# Patient Record
Sex: FEMALE
[De-identification: names, ages, dates, MRNs, and addresses within clinical notes are randomized per-mention and may not be internally consistent; named-entity substitution may affect disease eponyms.]

---

## 2023-09-07 PROBLEM — Z00.00 ENCOUNTER FOR PREVENTIVE HEALTH EXAMINATION: Status: ACTIVE | Noted: 2023-09-07

## 2023-09-09 ENCOUNTER — APPOINTMENT (OUTPATIENT)
Dept: ORTHOPEDIC SURGERY | Facility: CLINIC | Age: 73
End: 2023-09-09
Payer: MEDICARE

## 2023-09-09 VITALS — HEIGHT: 60 IN | WEIGHT: 257 LBS | BODY MASS INDEX: 50.45 KG/M2

## 2023-09-09 DIAGNOSIS — I10 ESSENTIAL (PRIMARY) HYPERTENSION: ICD-10-CM

## 2023-09-09 DIAGNOSIS — M47.816 SPONDYLOSIS W/OUT MYELOPATHY OR RADICULOPATHY, LUMBAR REGION: ICD-10-CM

## 2023-09-09 DIAGNOSIS — R07.81 PLEURODYNIA: ICD-10-CM

## 2023-09-09 DIAGNOSIS — M47.814 SPONDYLOSIS W/OUT MYELOPATHY OR RADICULOPATHY, THORACIC REGION: ICD-10-CM

## 2023-09-09 DIAGNOSIS — M51.24 OTHER INTERVERTEBRAL DISC DISPLACEMENT, THORACIC REGION: ICD-10-CM

## 2023-09-09 PROCEDURE — 99204 OFFICE O/P NEW MOD 45 MIN: CPT

## 2023-09-09 PROCEDURE — 71100 X-RAY EXAM RIBS UNI 2 VIEWS: CPT | Mod: RT

## 2023-09-09 PROCEDURE — 72100 X-RAY EXAM L-S SPINE 2/3 VWS: CPT

## 2023-09-09 PROCEDURE — 72070 X-RAY EXAM THORAC SPINE 2VWS: CPT

## 2023-09-09 RX ORDER — LIDOCAINE 5% 700 MG/1
5 PATCH TOPICAL
Qty: 1 | Refills: 2 | Status: ACTIVE | COMMUNITY
Start: 2023-09-09 | End: 1900-01-01

## 2023-09-09 NOTE — HISTORY OF PRESENT ILLNESS
[10] : 10 [Localized] : localized [de-identified] : 9/9/23:  74 yo F with right sided mid back pain - 1 month of symptoms - no trauma - no particular triggers - comes and goes   no medications for this  No tx so far (PT/chiro/accupuncture) No surgeries to  the area Not using cane/waler   xrays today: T spine - spondylosis  L spine- spondylosis R ribs - no obvious rib fracture  uses gabapentin after skull surgery HTN No hx of cancer  No loss of bb control   retired  [] : no [FreeTextEntry1] : Mid back  [FreeTextEntry3] : 1 month  [FreeTextEntry5] : no known injury, pt went to [facility] on 9/1/2023 was to see an orthopedic, was told to take Tylenol which gave no relief  [FreeTextEntry6] : discomfort  [de-identified] : 9/1/2023 [de-identified] : movement  [de-identified] : [facility]

## 2024-09-21 ENCOUNTER — APPOINTMENT (OUTPATIENT)
Dept: CT IMAGING | Facility: CLINIC | Age: 74
End: 2024-09-21
Payer: MEDICARE

## 2024-09-21 ENCOUNTER — OUTPATIENT (OUTPATIENT)
Dept: OUTPATIENT SERVICES | Facility: HOSPITAL | Age: 74
LOS: 1 days | End: 2024-09-21
Payer: MEDICARE

## 2024-09-21 DIAGNOSIS — Z00.8 ENCOUNTER FOR OTHER GENERAL EXAMINATION: ICD-10-CM

## 2024-09-21 PROCEDURE — 70450 CT HEAD/BRAIN W/O DYE: CPT

## 2024-09-21 PROCEDURE — 70450 CT HEAD/BRAIN W/O DYE: CPT | Mod: 26
